# Patient Record
Sex: FEMALE | Race: WHITE | NOT HISPANIC OR LATINO | Employment: PART TIME | URBAN - METROPOLITAN AREA
[De-identification: names, ages, dates, MRNs, and addresses within clinical notes are randomized per-mention and may not be internally consistent; named-entity substitution may affect disease eponyms.]

---

## 2022-06-20 ENCOUNTER — OFFICE VISIT (OUTPATIENT)
Dept: URGENT CARE | Facility: URGENT CARE | Age: 22
End: 2022-06-20
Payer: OTHER MISCELLANEOUS

## 2022-06-20 VITALS
DIASTOLIC BLOOD PRESSURE: 77 MMHG | TEMPERATURE: 98.7 F | HEART RATE: 64 BPM | SYSTOLIC BLOOD PRESSURE: 118 MMHG | WEIGHT: 150 LBS | RESPIRATION RATE: 20 BRPM | OXYGEN SATURATION: 97 %

## 2022-06-20 DIAGNOSIS — S61.258A DOG BITE OF INDEX FINGER, INITIAL ENCOUNTER: Primary | ICD-10-CM
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
## 2022-06-20 PROCEDURE — 99203 OFFICE O/P NEW LOW 30 MIN: CPT | Performed by: FAMILY MEDICINE

## 2022-06-20 RX ORDER — BUSPIRONE HYDROCHLORIDE 10 MG/1
TABLET ORAL
COMMUNITY

## 2022-06-20 RX ORDER — MONTELUKAST SODIUM 10 MG/1
TABLET ORAL
COMMUNITY
Start: 2022-06-06

## 2022-06-20 RX ORDER — NORETHINDRONE ACETATE/ETHINYL ESTRADIOL AND FERROUS FUMARATE 1MG-20(24)
KIT ORAL
COMMUNITY
Start: 2022-04-20

## 2022-06-20 RX ORDER — AZELASTINE HCL 205.5 UG/1
SPRAY NASAL
COMMUNITY
Start: 2022-06-06

## 2022-06-20 NOTE — PROGRESS NOTES
SUBJECTIVE:  Cortney Wilkins is a 22 year old female who presents with a chief complaint of an animal bite on the fingers.  She was bitten by a dog today.   Cicumstances of bite: provoked attack - .   Animal's immunizations up to date   Associated symptoms: immediate pain    tetanus status unknown to the patient she will contact her Clinic and make sure Tetnus is UTD    No past medical history on file.    Current Outpatient Medications:      amitriptyline (ELAVIL) 25 MG tablet, , Disp: , Rfl:      amoxicillin-clavulanate (AUGMENTIN) 875-125 MG tablet, Take 1 tablet by mouth 2 times daily for 7 days, Disp: 14 tablet, Rfl: 0     azelastine (ASTEPRO) 0.15 % nasal spray, , Disp: , Rfl:      busPIRone (BUSPAR) 10 MG tablet, buspirone 10 mg tablet, Disp: , Rfl:      MICROGESTIN 24 FE 1-20 MG-MCG tablet, , Disp: , Rfl:      montelukast (SINGULAIR) 10 MG tablet, , Disp: , Rfl:   Social History     Tobacco Use     Smoking status: Never Smoker     Smokeless tobacco: Never Used   Substance Use Topics     Alcohol use: Not Currently       ROS:  CONSTITUTIONAL:NEGATIVE for fever, chills, change in weight    OBJECTIVE:  /77   Pulse 64   Temp 98.7  F (37.1  C)   Resp 20   Wt 68 kg (150 lb)   SpO2 97%   GENERAL: healthy, alert no acute distress  SKIN: abrasion and puncture wound of fingers  MS:extremities normal- no gross deformities noted,  FROM noted in all extremities  NEURO: Normal strength and tone, sensory exam grossly normal,  normal speech and mentation    ASSESSMENT:    ICD-10-CM    1. Dog bite of index finger, initial encounter  S61.258A amoxicillin-clavulanate (AUGMENTIN) 875-125 MG tablet    W54.0XXA